# Patient Record
Sex: MALE | NOT HISPANIC OR LATINO | URBAN - METROPOLITAN AREA
[De-identification: names, ages, dates, MRNs, and addresses within clinical notes are randomized per-mention and may not be internally consistent; named-entity substitution may affect disease eponyms.]

---

## 2017-09-22 ENCOUNTER — EMERGENCY (EMERGENCY)
Facility: HOSPITAL | Age: 57
LOS: 1 days | Discharge: HOME | End: 2017-09-22
Attending: EMERGENCY MEDICINE | Admitting: EMERGENCY MEDICINE
Payer: COMMERCIAL

## 2017-09-22 VITALS
SYSTOLIC BLOOD PRESSURE: 139 MMHG | HEART RATE: 76 BPM | DIASTOLIC BLOOD PRESSURE: 90 MMHG | OXYGEN SATURATION: 98 % | RESPIRATION RATE: 18 BRPM

## 2017-09-22 VITALS
HEART RATE: 82 BPM | OXYGEN SATURATION: 97 % | DIASTOLIC BLOOD PRESSURE: 96 MMHG | RESPIRATION RATE: 16 BRPM | SYSTOLIC BLOOD PRESSURE: 169 MMHG

## 2017-09-22 DIAGNOSIS — M62.830 MUSCLE SPASM OF BACK: ICD-10-CM

## 2017-09-22 DIAGNOSIS — I10 ESSENTIAL (PRIMARY) HYPERTENSION: ICD-10-CM

## 2017-09-22 LAB
ALBUMIN SERPL ELPH-MCNC: 5.1 G/DL — HIGH (ref 3.3–5)
ALP SERPL-CCNC: 64 U/L — SIGNIFICANT CHANGE UP (ref 40–120)
ALT FLD-CCNC: 22 U/L RC — SIGNIFICANT CHANGE UP (ref 10–45)
ANION GAP SERPL CALC-SCNC: 16 MMOL/L — SIGNIFICANT CHANGE UP (ref 5–17)
AST SERPL-CCNC: 36 U/L — SIGNIFICANT CHANGE UP (ref 10–40)
BASOPHILS # BLD AUTO: 0.1 K/UL — SIGNIFICANT CHANGE UP (ref 0–0.2)
BASOPHILS NFR BLD AUTO: 0.9 % — SIGNIFICANT CHANGE UP (ref 0–2)
BILIRUB SERPL-MCNC: 0.6 MG/DL — SIGNIFICANT CHANGE UP (ref 0.2–1.2)
BUN SERPL-MCNC: 12 MG/DL — SIGNIFICANT CHANGE UP (ref 7–23)
CALCIUM SERPL-MCNC: 9.6 MG/DL — SIGNIFICANT CHANGE UP (ref 8.4–10.5)
CHLORIDE SERPL-SCNC: 99 MMOL/L — SIGNIFICANT CHANGE UP (ref 96–108)
CO2 SERPL-SCNC: 26 MMOL/L — SIGNIFICANT CHANGE UP (ref 22–31)
CREAT SERPL-MCNC: 1.01 MG/DL — SIGNIFICANT CHANGE UP (ref 0.5–1.3)
EOSINOPHIL # BLD AUTO: 0.1 K/UL — SIGNIFICANT CHANGE UP (ref 0–0.5)
EOSINOPHIL NFR BLD AUTO: 0.9 % — SIGNIFICANT CHANGE UP (ref 0–6)
GLUCOSE SERPL-MCNC: 98 MG/DL — SIGNIFICANT CHANGE UP (ref 70–99)
HCT VFR BLD CALC: 46.5 % — SIGNIFICANT CHANGE UP (ref 39–50)
HGB BLD-MCNC: 15.2 G/DL — SIGNIFICANT CHANGE UP (ref 13–17)
LYMPHOCYTES # BLD AUTO: 2.3 K/UL — SIGNIFICANT CHANGE UP (ref 1–3.3)
LYMPHOCYTES # BLD AUTO: 33.9 % — SIGNIFICANT CHANGE UP (ref 13–44)
MCHC RBC-ENTMCNC: 28 PG — SIGNIFICANT CHANGE UP (ref 27–34)
MCHC RBC-ENTMCNC: 32.7 GM/DL — SIGNIFICANT CHANGE UP (ref 32–36)
MCV RBC AUTO: 85.4 FL — SIGNIFICANT CHANGE UP (ref 80–100)
MONOCYTES # BLD AUTO: 0.4 K/UL — SIGNIFICANT CHANGE UP (ref 0–0.9)
MONOCYTES NFR BLD AUTO: 6.1 % — SIGNIFICANT CHANGE UP (ref 2–14)
NEUTROPHILS # BLD AUTO: 4 K/UL — SIGNIFICANT CHANGE UP (ref 1.8–7.4)
NEUTROPHILS NFR BLD AUTO: 58.1 % — SIGNIFICANT CHANGE UP (ref 43–77)
PLATELET # BLD AUTO: 234 K/UL — SIGNIFICANT CHANGE UP (ref 150–400)
POTASSIUM SERPL-MCNC: 5.4 MMOL/L — HIGH (ref 3.5–5.3)
POTASSIUM SERPL-SCNC: 5.4 MMOL/L — HIGH (ref 3.5–5.3)
PROT SERPL-MCNC: 8.3 G/DL — SIGNIFICANT CHANGE UP (ref 6–8.3)
RBC # BLD: 5.45 M/UL — SIGNIFICANT CHANGE UP (ref 4.2–5.8)
RBC # FLD: 12.8 % — SIGNIFICANT CHANGE UP (ref 10.3–14.5)
SODIUM SERPL-SCNC: 141 MMOL/L — SIGNIFICANT CHANGE UP (ref 135–145)
WBC # BLD: 6.8 K/UL — SIGNIFICANT CHANGE UP (ref 3.8–10.5)
WBC # FLD AUTO: 6.8 K/UL — SIGNIFICANT CHANGE UP (ref 3.8–10.5)

## 2017-09-22 PROCEDURE — 73502 X-RAY EXAM HIP UNI 2-3 VIEWS: CPT | Mod: 26,RT

## 2017-09-22 PROCEDURE — 99284 EMERGENCY DEPT VISIT MOD MDM: CPT | Mod: 25

## 2017-09-22 PROCEDURE — 99285 EMERGENCY DEPT VISIT HI MDM: CPT

## 2017-09-22 PROCEDURE — 80053 COMPREHEN METABOLIC PANEL: CPT

## 2017-09-22 PROCEDURE — 73502 X-RAY EXAM HIP UNI 2-3 VIEWS: CPT

## 2017-09-22 PROCEDURE — 70450 CT HEAD/BRAIN W/O DYE: CPT

## 2017-09-22 PROCEDURE — 70450 CT HEAD/BRAIN W/O DYE: CPT | Mod: 26

## 2017-09-22 PROCEDURE — 85027 COMPLETE CBC AUTOMATED: CPT

## 2017-09-22 RX ORDER — DIAZEPAM 5 MG
1 TABLET ORAL
Qty: 9 | Refills: 0 | OUTPATIENT
Start: 2017-09-22 | End: 2017-09-25

## 2017-09-22 RX ORDER — IBUPROFEN 200 MG
600 TABLET ORAL ONCE
Qty: 0 | Refills: 0 | Status: COMPLETED | OUTPATIENT
Start: 2017-09-22 | End: 2017-09-22

## 2017-09-22 RX ADMIN — Medication 600 MILLIGRAM(S): at 13:20

## 2017-09-22 NOTE — ED ADULT NURSE NOTE - OBJECTIVE STATEMENT
57 yr old male to ed c/o rt buttuck pain rad rle since tues Denies chest pain Denies sob denies abd pain Denies n/v has h/o htn has not taken medx1 yr

## 2017-09-22 NOTE — CONSULT NOTE ADULT - PROBLEM SELECTOR RECOMMENDATION 9
Mobic 7.5 q 12 prn and flexaril or valium 5-8 days PRN. Mobic 7.5 q 12 prn and flexaril or valium 5-8 days PRN. NO evidence of cord compression or compressed nerve root.  No weakness.   Outpatient follow up for workup with CHARLIE Quinn at [phone].

## 2017-09-22 NOTE — CONSULT NOTE ADULT - SUBJECTIVE AND OBJECTIVE BOX
NEUROLOGY CONSULT  RIVERA [id number] is a 57y old  Male who presents with a chief complaint of     SUBJECTIVE  HPI:        REVIEW OF SYSTEMS      General:	    Skin/Breast:  	  Ophthalmologic:  	  ENMT:	    Respiratory and Thorax:  	  Cardiovascular:	    Gastrointestinal:	    Genitourinary:	    Musculoskeletal:	    Neurological:	    Psychiatric:	    Hematology/Lymphatics:	    Endocrine:	    Allergic/Immunologic:	    PAST MEDICAL & SURGICAL HISTORY:  Hypertension          No Known Allergies      FAMILY HISTORY:    Social History:    Marital Status:  (   )    (   ) Single    (   )    (  )   Occupation:   Lives with: (  ) alone  (  ) children   (  ) spouse   (  ) parents  (  ) other    Substance Use (street drugs): (  ) never used  (  ) other:  Tobacco Usage:  (   ) never smoked   (   ) former smoker   (   ) current smoker  (     ) pack year  (        ) last cigarette date  Alcohol Usage:  Sexual History:     Health Management     For female:   Last Mammo: (     ) No  (    ) Yes  (    ) Normal  (    ) Date   Last Pap: (     ) No  (    ) Yes  (    ) Normal   (    ) Date     For male:  Last prostate exam: (     ) No  (    ) Yes  (    ) Date  (    ) Normal    Immunization Hx:   (  ) flu shot                               (     ) date   (  ) pneumonia shot               (     ) date  (  ) tetanus                               (     ) date     (     ) Advanced Directives: (     ) None    (      ) DNR    (     ) DNI    (     ) Health Care Proxy:     OBJECTIVE  T(C): 37.1 (09-22-17 @ 15:00), Max: 37.1 (09-22-17 @ 15:00)  HR: 69 (09-22-17 @ 16:19) (69 - 88)  BP: 156/85 (09-22-17 @ 16:19) (156/85 - 180/110)  RR: 18 (09-22-17 @ 16:19) (16 - 18)  SpO2: 96% (09-22-17 @ 16:19) (95% - 97%)  Wt(kg): --      PHYSICAL EXAM:      Constitutional:    Eyes:    ENMT:    Neck:    Breasts:    Back:    Respiratory:    Cardiovascular:    Gastrointestinal:    Genitourinary:    Rectal:    Extremities:    Vascular:    Neurological:    Skin:    Lymph Nodes:    Musculoskeletal:    Psychiatric:            CAPILLARY BLOOD GLUCOSE        09-22    141  |  99  |  12  ----------------------------<  98  5.4<H>   |  26  |  1.01    Ca    9.6      22 Sep 2017 13:49    TPro  8.3  /  Alb  5.1<H>  /  TBili  0.6  /  DBili  x   /  AST  36  /  ALT  22  /  AlkPhos  64  09-22      CBC Full  -  ( 22 Sep 2017 13:49 )  WBC Count : 6.8 K/uL  Hemoglobin : 15.2 g/dL  Hematocrit : 46.5 %  Platelet Count - Automated : 234 K/uL  Mean Cell Volume : 85.4 fl  Mean Cell Hemoglobin : 28.0 pg  Mean Cell Hemoglobin Concentration : 32.7 gm/dL  Auto Neutrophil # : 4.0 K/uL  Auto Lymphocyte # : 2.3 K/uL  Auto Monocyte # : 0.4 K/uL  Auto Eosinophil # : 0.1 K/uL  Auto Basophil # : 0.1 K/uL  Auto Neutrophil % : 58.1 %  Auto Lymphocyte % : 33.9 %  Auto Monocyte % : 6.1 %  Auto Eosinophil % : 0.9 %  Auto Basophil % : 0.9 %          RADIOLOGY RESULTS:  < from: CT Head No Cont (09.22.17 @ 15:45) >  Ventricles and sulci are appropriate size for patient's stated age. There   is no hydrocephalus.  There is no intracranial hemorrhage, mass effect, midline shift, or   territorial infarct.  There is minimal mucosal thickening in the paranasal sinuses. The   mastoids are clear.  There is no calvarial fracture.    < end of copied text > NEUROLOGY CONSULT  RIVERA SWEENEY    SUBJECTIVE  HPI:  57y old RH AA man who presents BIBA from urgent care (2/2 HTN) with a chief complaint of shooting pain down the right leg once on Tuesday.  He carried a large crate of water several blocks preceding the event.  He went to urgent care because he has had continued back pain on lower R and intermittent numbness on lateral aspect of R thigh to just below knee.  He was found to have SBP near 200 and was sent by ambulance to ED.  The sentinel event consisted of an uncomfortable crack or pop in his R hip/groin which he heard and felt simultaneously. The shooting pain sensation happened once and traveled from his lower R back down past his lateral hip to the outside of his R knee.     REVIEW OF SYSTEMS    General:	no malaise no fever.  insomnia as above 2/2 pain and MSK discomfort  	  Ophthalmologic: no vision loss no blurry or double vision  	  ENMT:	no trouble speaking no trouble swall    Respiratory and Thorax: no SOB  	  Cardiovascular:	no CP    Gastrointestinal:	no N/V    Musculoskeletal:	 mod-severe LBP R    Neurological:	no weakness no dizziness no LOC    Psychiatric:	normal mood    PAST MEDICAL & SURGICAL HISTORY:  Hypertension      No Known Allergies      FAMILY HISTORY:    Social History:    Marital Status:  (   )    ( x  ) Single    (   )    (  )   Lives with: (  ) alone  (  ) children   (  ) spouse   (  ) parents  ( x ) significant other    Substance Use (street drugs): ( x ) never used  (  ) other: no cocaine no marijuana  Tobacco Usage:  (  x ) never smoked   (   ) former smoker   (   ) current smoker  (     ) pack year  (        ) last cigarette date  Alcohol Usage: rare 1 oz congag on social occasions  Sexual History: in long-term monogamous relationship--SO at bedside      OBJECTIVE  T(C): 37.1 (09-22-17 @ 15:00), Max: 37.1 (09-22-17 @ 15:00)  HR: 69 (09-22-17 @ 16:19) (69 - 88)  BP: 156/85 (09-22-17 @ 16:19) (156/85 - 180/110)  RR: 18 (09-22-17 @ 16:19) (16 - 18)  SpO2: 96% (09-22-17 @ 16:19) (95% - 97%)  Wt(kg): --      PHYSICAL EXAM:      Constitutional: well-developed well-nourished man in no acute distress     Eyes: sclera clear    Back: no step off no midline     Respiratory:    Cardiovascular:    Gastrointestinal:    Genitourinary:    Rectal:    Extremities:    Vascular:    Neurological:    Skin:    Lymph Nodes:    Musculoskeletal:    Psychiatric:            CAPILLARY BLOOD GLUCOSE        09-22    141  |  99  |  12  ----------------------------<  98  5.4<H>   |  26  |  1.01    Ca    9.6      22 Sep 2017 13:49    TPro  8.3  /  Alb  5.1<H>  /  TBili  0.6  /  DBili  x   /  AST  36  /  ALT  22  /  AlkPhos  64  09-22      CBC Full  -  ( 22 Sep 2017 13:49 )  WBC Count : 6.8 K/uL  Hemoglobin : 15.2 g/dL  Hematocrit : 46.5 %  Platelet Count - Automated : 234 K/uL  Mean Cell Volume : 85.4 fl  Mean Cell Hemoglobin : 28.0 pg  Mean Cell Hemoglobin Concentration : 32.7 gm/dL  Auto Neutrophil # : 4.0 K/uL  Auto Lymphocyte # : 2.3 K/uL  Auto Monocyte # : 0.4 K/uL  Auto Eosinophil # : 0.1 K/uL  Auto Basophil # : 0.1 K/uL  Auto Neutrophil % : 58.1 %  Auto Lymphocyte % : 33.9 %  Auto Monocyte % : 6.1 %  Auto Eosinophil % : 0.9 %  Auto Basophil % : 0.9 %          RADIOLOGY RESULTS:  < from: CT Head No Cont (09.22.17 @ 15:45) >  Ventricles and sulci are appropriate size for patient's stated age. There   is no hydrocephalus.  There is no intracranial hemorrhage, mass effect, midline shift, or   territorial infarct.  There is minimal mucosal thickening in the paranasal sinuses. The   mastoids are clear.  There is no calvarial fracture.    < end of copied text > NEUROLOGY CONSULT  RIVERA SWEENEY    SUBJECTIVE  HPI:  57y old RH AA man who presents BIBA from urgent care (2/2 HTN) with a chief complaint of shooting pain down the right leg once on Tuesday.  He carried a large crate of water several blocks preceding the event.  He went to urgent care because he has had continued back pain on lower R and intermittent numbness on lateral aspect of R thigh to just below knee.  He was found to have SBP near 200 and was sent by ambulance to ED.  The sentinel event consisted of an uncomfortable crack or pop in his R hip/groin which he heard and felt simultaneously. The shooting pain sensation happened once and traveled from his lower R back down past his lateral hip to the outside of his R knee.     REVIEW OF SYSTEMS    General:	no malaise no fever.  insomnia as above 2/2 pain and MSK discomfort  	  Ophthalmologic: no vision loss no blurry or double vision  	  ENMT:	no trouble speaking no trouble swall    Respiratory and Thorax: no SOB  	  Cardiovascular:	no CP    Gastrointestinal:	no N/V    Musculoskeletal:	 mod-severe LBP R    Neurological:	no weakness no dizziness no LOC    Psychiatric:	normal mood    PAST MEDICAL & SURGICAL HISTORY:  Hypertension      No Known Allergies      FAMILY HISTORY:    Social History:    Marital Status:  (   )    ( x  ) Single    (   )    (  )   Lives with: (  ) alone  (  ) children   (  ) spouse   (  ) parents  ( x ) significant other    Substance Use (street drugs): ( x ) never used  (  ) other: no cocaine no marijuana  Tobacco Usage:  (  x ) never smoked   (   ) former smoker   (   ) current smoker  (     ) pack year  (        ) last cigarette date  Alcohol Usage: rare 1 oz congag on social occasions  Sexual History: in long-term monogamous relationship--SO at bedside      OBJECTIVE  T(C): 37.1 (09-22-17 @ 15:00), Max: 37.1 (09-22-17 @ 15:00)  HR: 69 (09-22-17 @ 16:19) (69 - 88)  BP: 156/85 (09-22-17 @ 16:19) (156/85 - 180/110)  RR: 18 (09-22-17 @ 16:19) (16 - 18)  SpO2: 96% (09-22-17 @ 16:19) (95% - 97%)  Wt(kg): --      PHYSICAL EXAM:      Constitutional: well-developed well-nourished man in no acute distress     Eyes: sclera clear    Back: no step off no midline tenderness to palpation normal shape    Respiratory: normal rhythm and effort no accessory muscle use    Cardiovascular: regular rate and rhythm no murmurs, rubs, or gallops S1 S2.  PMI palpated and in 6-7 rib space mid- axillary line    Extremities: no edema    Vascular: extremities warm and well-perfused    Neurological:  Mental Status: AAOX4, fluent, attends b/l, memory intact, no aphasia  Cranial Nerves: Visual acuity intact bilaterally, visual fields full to confrontation, pupils equal round and reactive to light, extraocular motion intact, facial sensation intact symmetrically in V1-V3 bilaterally, face symmetrical, hearing was intact bilaterally, head turning and shoulder shrug symmetric and there was no tongue deviation with protrusion. Palate rises symmetrically  Motor: 5/5 strength in all extremities, normal tone and bulk (muscular well-developed). no tremor  Deep tendon reflexes:   Biceps/BR/triceps right 2+. Biceps/BR/triceps left 2+  Patella right 2+. Patella left 2+.  Patellar downgoing bilaterally  Coordination: Finger to nose dysmetria was not present. Heel-shin dysmetria was not present.   Severe LBP with sitting     Skin: warm and dry    Musculoskeletal: no atrophy.  Severe LB spasm quadratus lumborum on R, worsening spasm with palpation and movement.  + straight leg raise on R    Psychiatric: normal mood affect and behavior      09-22    141  |  99  |  12  ----------------------------<  98  5.4<H>   |  26  |  1.01    Ca    9.6      22 Sep 2017 13:49    TPro  8.3  /  Alb  5.1<H>  /  TBili  0.6  /  DBili  x   /  AST  36  /  ALT  22  /  AlkPhos  64  09-22      CBC Full  -  ( 22 Sep 2017 13:49 )  WBC Count : 6.8 K/uL  Hemoglobin : 15.2 g/dL  Hematocrit : 46.5 %  Platelet Count - Automated : 234 K/uL  Mean Cell Volume : 85.4 fl  Mean Cell Hemoglobin : 28.0 pg  Mean Cell Hemoglobin Concentration : 32.7 gm/dL  Auto Neutrophil # : 4.0 K/uL  Auto Lymphocyte # : 2.3 K/uL  Auto Monocyte # : 0.4 K/uL  Auto Eosinophil # : 0.1 K/uL  Auto Basophil # : 0.1 K/uL  Auto Neutrophil % : 58.1 %  Auto Lymphocyte % : 33.9 %  Auto Monocyte % : 6.1 %  Auto Eosinophil % : 0.9 %  Auto Basophil % : 0.9 %          RADIOLOGY RESULTS:  < from: CT Head No Cont (09.22.17 @ 15:45) >  Ventricles and sulci are appropriate size for patient's stated age. There   is no hydrocephalus.  There is no intracranial hemorrhage, mass effect, midline shift, or   territorial infarct.  There is minimal mucosal thickening in the paranasal sinuses. The   mastoids are clear.  There is no calvarial fracture.    < end of copied text >

## 2017-09-22 NOTE — CONSULT NOTE ADULT - PROBLEM SELECTOR RECOMMENDATION 2
No evidence of hypertensive urgency.  Rx Norvasc 5mg daily by PCP--at pharmacy.   no stigmata of chronic HTN such as S4 or displaced PMI

## 2017-09-22 NOTE — ED PROVIDER NOTE - ATTENDING CONTRIBUTION TO CARE
58 y/o m with pmhx HTN diet controlled, presents from Urgent Care for elevated blood pressure and concern of right lower ext tingling and here w right hip pain that started Tuesday morning while driving on his way to work. c/o "pins and needles". He also associates it with right groin pain. Pt has never had these symptoms before. Pt denies fever, cp, n/v/d. wife with him at the bedside.  Gen. no acute distress, Non toxic   HEENT: EOMI, mmm,   Lungs: CTAB/L no C/ W /R   CVS: S1S2   Abd; Soft non tender, non distended   Ext: no edema   Neuro AAOx3 non focal clear speech

## 2017-09-22 NOTE — CONSULT NOTE ADULT - ASSESSMENT
57 year old RHB man with untreated HTN presents with cc of episode of shooting pain on R leg x1 on Tuesday.

## 2017-09-22 NOTE — ED PROVIDER NOTE - CARE PLAN
Instructions for follow-up, activity and diet:	1) Please follow-up with your primary care doctor about your blood pressure within the next 3 days. If you cannot follow-up with your doctor(s), please return to the ED for any urgent issues.  2) If you have any worsening of symptoms or any other concerns please return to the ED immediately.  3) Please continue taking your home medications as directed.  Also please fill the Norvasc prescription from your PMD and begin taking it as prescribed.  4) You may have been given a copy of your labs and/or imaging.  Please go over these with your primary care doctor.   5) If your leg pain and tingling persist, please make an appointment with Dr. Quinn for followup. He is reachable at [phone].  6) A prescription for your leg pain has been sent to your pharmacy. Please take as directed. Principal Discharge DX:	Hip pain  Instructions for follow-up, activity and diet:	1) Please follow-up with your primary care doctor about your blood pressure within the next 3 days. If you cannot follow-up with your doctor(s), please return to the ED for any urgent issues.  2) If you have any worsening of symptoms or any other concerns please return to the ED immediately.  3) Please continue taking your home medications as directed.  Also please fill the Norvasc prescription from your PMD and begin taking it as prescribed.  4) You may have been given a copy of your labs and/or imaging.  Please go over these with your primary care doctor.   5) If your leg pain and tingling persist, please make an appointment with Dr. Quinn for followup. He is reachable at [phone].  6) A prescription for your leg pain has been sent to your pharmacy. Please take as directed.  Secondary Diagnosis:	Uncontrolled hypertension

## 2017-09-22 NOTE — ED PROVIDER NOTE - PLAN OF CARE
1) Please follow-up with your primary care doctor about your blood pressure within the next 3 days. If you cannot follow-up with your doctor(s), please return to the ED for any urgent issues.  2) If you have any worsening of symptoms or any other concerns please return to the ED immediately.  3) Please continue taking your home medications as directed.  Also please fill the Norvasc prescription from your PMD and begin taking it as prescribed.  4) You may have been given a copy of your labs and/or imaging.  Please go over these with your primary care doctor.   5) If your leg pain and tingling persist, please make an appointment with Dr. Quinn for followup. He is reachable at [phone].  6) A prescription for your leg pain has been sent to your pharmacy. Please take as directed.

## 2017-09-22 NOTE — ED PROVIDER NOTE - OBJECTIVE STATEMENT
57M history of htn, not compliant w meds, here w right hip pain that started Tuesday morning while driving on his way to work. Pt says pain radiates down his leg to his lateral lower extremity and foot, feels lije "pins and needles". He also associates it with right groin pain. Pt has never had these symptoms before. Pt denies fever, cp, n/v/d.

## 2017-09-22 NOTE — ED PROVIDER NOTE - MEDICAL DECISION MAKING DETAILS
57M pmh htn sent from urgent care for rule out CVA. PE findings concerning for peripheral neuropathy, likely sciatica or right hip OA. Rule out CVA tho plan for CT Head, pain control, basics 57M pmh htn sent from urgent care for rule out CVA. PE findings concerning for peripheral neuropathy, likely sciatica or right hip OA. Rule out CVA tho plan for CT Head, pain control, basics  ATTD: dr. Broussard - hip pain with elevated blood pressure and changes in sensation on lower ext, check labs, check ct, neuro eval.